# Patient Record
Sex: MALE | Race: BLACK OR AFRICAN AMERICAN | Employment: PART TIME | URBAN - METROPOLITAN AREA
[De-identification: names, ages, dates, MRNs, and addresses within clinical notes are randomized per-mention and may not be internally consistent; named-entity substitution may affect disease eponyms.]

---

## 2020-09-30 ENCOUNTER — HOSPITAL ENCOUNTER (EMERGENCY)
Age: 25
Discharge: HOME | End: 2020-10-01
Attending: EMERGENCY MEDICINE
Payer: COMMERCIAL

## 2020-09-30 ENCOUNTER — APPOINTMENT (OUTPATIENT)
Dept: GENERAL RADIOLOGY | Age: 25
End: 2020-09-30
Attending: EMERGENCY MEDICINE
Payer: COMMERCIAL

## 2020-09-30 ENCOUNTER — HOSPITAL ENCOUNTER (EMERGENCY)
Age: 25
Discharge: STILL ADMITTED OR ADMINISTRATIVE | End: 2020-09-30

## 2020-09-30 DIAGNOSIS — R29.0 CARPOPEDAL SPASM: ICD-10-CM

## 2020-09-30 DIAGNOSIS — Z20.822 EXPOSURE TO COVID-19 VIRUS: ICD-10-CM

## 2020-09-30 DIAGNOSIS — R07.89 LEFT-SIDED CHEST WALL PAIN: Primary | ICD-10-CM

## 2020-09-30 LAB
ALBUMIN SERPL-MCNC: 4.3 G/DL (ref 3.5–5)
ALBUMIN/GLOB SERPL: 1.1 {RATIO} (ref 1.2–3.5)
ALP SERPL-CCNC: 45 U/L (ref 50–136)
ALT SERPL-CCNC: 23 U/L (ref 12–65)
ANION GAP SERPL CALC-SCNC: 7 MMOL/L (ref 7–16)
AST SERPL-CCNC: 24 U/L (ref 15–37)
BASOPHILS # BLD: 0 K/UL (ref 0–0.2)
BASOPHILS NFR BLD: 1 % (ref 0–2)
BILIRUB SERPL-MCNC: 2 MG/DL (ref 0.2–1.1)
BUN SERPL-MCNC: 9 MG/DL (ref 6–23)
CALCIUM SERPL-MCNC: 9.4 MG/DL (ref 8.3–10.4)
CHLORIDE SERPL-SCNC: 106 MMOL/L (ref 98–107)
CO2 SERPL-SCNC: 27 MMOL/L (ref 21–32)
CREAT SERPL-MCNC: 1.06 MG/DL (ref 0.8–1.5)
DIFFERENTIAL METHOD BLD: ABNORMAL
EOSINOPHIL # BLD: 0 K/UL (ref 0–0.8)
EOSINOPHIL NFR BLD: 1 % (ref 0.5–7.8)
ERYTHROCYTE [DISTWIDTH] IN BLOOD BY AUTOMATED COUNT: 11.9 % (ref 11.9–14.6)
GLOBULIN SER CALC-MCNC: 3.9 G/DL (ref 2.3–3.5)
GLUCOSE SERPL-MCNC: 113 MG/DL (ref 65–100)
HCT VFR BLD AUTO: 41.8 % (ref 41.1–50.3)
HGB BLD-MCNC: 14.4 G/DL (ref 13.6–17.2)
IMM GRANULOCYTES # BLD AUTO: 0 K/UL (ref 0–0.5)
IMM GRANULOCYTES NFR BLD AUTO: 0 % (ref 0–5)
LYMPHOCYTES # BLD: 2.3 K/UL (ref 0.5–4.6)
LYMPHOCYTES NFR BLD: 34 % (ref 13–44)
MCH RBC QN AUTO: 30.3 PG (ref 26.1–32.9)
MCHC RBC AUTO-ENTMCNC: 34.4 G/DL (ref 31.4–35)
MCV RBC AUTO: 87.8 FL (ref 79.6–97.8)
MONOCYTES # BLD: 0.6 K/UL (ref 0.1–1.3)
MONOCYTES NFR BLD: 10 % (ref 4–12)
NEUTS SEG # BLD: 3.7 K/UL (ref 1.7–8.2)
NEUTS SEG NFR BLD: 55 % (ref 43–78)
NRBC # BLD: 0 K/UL (ref 0–0.2)
PLATELET # BLD AUTO: 274 K/UL (ref 150–450)
PMV BLD AUTO: 9.2 FL (ref 9.4–12.3)
POTASSIUM SERPL-SCNC: 3.1 MMOL/L (ref 3.5–5.1)
PROT SERPL-MCNC: 8.2 G/DL (ref 6.3–8.2)
RBC # BLD AUTO: 4.76 M/UL (ref 4.23–5.6)
SODIUM SERPL-SCNC: 140 MMOL/L (ref 136–145)
TROPONIN-HIGH SENSITIVITY: 4.7 PG/ML (ref 0–14)
WBC # BLD AUTO: 6.6 K/UL (ref 4.3–11.1)

## 2020-09-30 PROCEDURE — 99284 EMERGENCY DEPT VISIT MOD MDM: CPT

## 2020-09-30 PROCEDURE — 93005 ELECTROCARDIOGRAM TRACING: CPT | Performed by: EMERGENCY MEDICINE

## 2020-09-30 PROCEDURE — 71046 X-RAY EXAM CHEST 2 VIEWS: CPT

## 2020-09-30 PROCEDURE — 74011250636 HC RX REV CODE- 250/636: Performed by: EMERGENCY MEDICINE

## 2020-09-30 PROCEDURE — 87635 SARS-COV-2 COVID-19 AMP PRB: CPT

## 2020-09-30 PROCEDURE — 84484 ASSAY OF TROPONIN QUANT: CPT

## 2020-09-30 PROCEDURE — 96374 THER/PROPH/DIAG INJ IV PUSH: CPT

## 2020-09-30 PROCEDURE — 80053 COMPREHEN METABOLIC PANEL: CPT

## 2020-09-30 PROCEDURE — 85025 COMPLETE CBC W/AUTO DIFF WBC: CPT

## 2020-09-30 PROCEDURE — 74011000250 HC RX REV CODE- 250: Performed by: EMERGENCY MEDICINE

## 2020-09-30 RX ORDER — LIDOCAINE 50 MG/G
PATCH TOPICAL
Qty: 5 EACH | Refills: 0 | Status: SHIPPED | OUTPATIENT
Start: 2020-09-30

## 2020-09-30 RX ORDER — LIDOCAINE 4 G/100G
1 PATCH TOPICAL EVERY 24 HOURS
Status: DISCONTINUED | OUTPATIENT
Start: 2020-09-30 | End: 2020-10-01 | Stop reason: HOSPADM

## 2020-09-30 RX ORDER — NAPROXEN 500 MG/1
500 TABLET ORAL 2 TIMES DAILY WITH MEALS
Qty: 20 TAB | Refills: 0 | Status: SHIPPED | OUTPATIENT
Start: 2020-09-30 | End: 2020-10-10

## 2020-09-30 RX ORDER — KETOROLAC TROMETHAMINE 30 MG/ML
15 INJECTION, SOLUTION INTRAMUSCULAR; INTRAVENOUS
Status: COMPLETED | OUTPATIENT
Start: 2020-09-30 | End: 2020-09-30

## 2020-09-30 RX ADMIN — KETOROLAC TROMETHAMINE 15 MG: 30 INJECTION, SOLUTION INTRAMUSCULAR at 22:53

## 2020-09-30 NOTE — Clinical Note
[facility] EMERGENCY DEPT 
ONE  [address] [facility] 
[phone] Work/School Note Date: 9/30/2020 To Whom It May concern: 
 
Cheri Zuleta was seen and treated today in the emergency room by the following provider(s): 
Attending Provider: Beryle Comer, MD.   
 
Cheri Zuleta is excused from work/school on 09/30/20 and 10/01/20. He is medically clear to return to work/school on 10/2/2020.   
 
 
Sincerely, 
 
 
 
 
Gabrielle Pena MD

## 2020-10-01 VITALS
WEIGHT: 180 LBS | DIASTOLIC BLOOD PRESSURE: 78 MMHG | BODY MASS INDEX: 22.38 KG/M2 | HEIGHT: 75 IN | SYSTOLIC BLOOD PRESSURE: 137 MMHG | OXYGEN SATURATION: 100 % | RESPIRATION RATE: 17 BRPM | TEMPERATURE: 99.8 F | HEART RATE: 70 BPM

## 2020-10-01 LAB
ATRIAL RATE: 88 BPM
CALCULATED P AXIS, ECG09: 67 DEGREES
CALCULATED R AXIS, ECG10: 69 DEGREES
CALCULATED T AXIS, ECG11: 43 DEGREES
COVID-19 RAPID TEST, COVR: NOT DETECTED
DIAGNOSIS, 93000: NORMAL
P-R INTERVAL, ECG05: 154 MS
Q-T INTERVAL, ECG07: 346 MS
QRS DURATION, ECG06: 86 MS
QTC CALCULATION (BEZET), ECG08: 418 MS
SARS COV-2, XPGCVT: NEGATIVE
SOURCE, COVRS: NORMAL
VENTRICULAR RATE, ECG03: 88 BPM

## 2020-10-01 NOTE — ED PROVIDER NOTES
[facility] Emergency Department  Arrival Date/Time: 9/30/2020 @  9:20 PM      Hung Noriega  MRN: [id number]      23 y.o. male    YOB: 1995   No relevant phone numbers on file. 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 Was pushing him around. Started having some left-sided chest wall pain. He continue to work through tolerated it for a while but it got progressively worse. He called his girlfriend subsequently called his mom. She called EMS. At that point he was having severe left-sided point tenderness. EMS was called and he was transported to an outside facility. He was triaged to the waiting room and when they called his name again he was unable to respond because he felt so weak he subsequently stood up fell to the ground. Subsequently they left the facility and his mom brought him here for evaluation    He complains of some numbness and tingling spasms of the hands bilaterally when the pain was severe. He also reports spending the night in skilled nursing Sunday and Monday and is concerned that he may have been exposed to Covid-19    No fever no chills no cough  He did have some shortness of breath earlier. Particularly when he takes a deep breath and it makes his chest hurt more. HPI    Review of Systems: Review of Systems   Constitutional: Negative for activity change, chills and fever. Respiratory: Positive for shortness of breath. Cardiovascular: Positive for chest pain. Gastrointestinal: Negative. Neurological: Negative. Psychiatric/Behavioral: Negative.         Past Medical History: Primary Care Doctor: None  Meds, PMH, PSHx, SocHx at end of this note     Allergies: No Known Allergies      Key Anti-Platelet Anticoagulant Meds     The patient is on no antiplatelet meds or anticoagulants. Physical Exam:  Nursing documentation reviewed. Patient Vitals for the past 24 hrs:   Temp Pulse Resp BP SpO2   09/30/20 2345  70 17 137/78 100 %   09/30/20 2314   16 139/85 100 %   09/30/20 2215    137/78 97 %   09/30/20 2125 99.8 °F (37.7 °C) 85 16 137/73 97 %    Vital signs were reviewed. Physical Exam  Vitals signs and nursing note reviewed. Constitutional:       General: He is not in acute distress. Appearance: He is well-developed. He is not ill-appearing. HENT:      Head: Normocephalic. Eyes:      Pupils: Pupils are equal, round, and reactive to light. Neck:      Musculoskeletal: Normal range of motion and neck supple. Cardiovascular:      Rate and Rhythm: Normal rate and regular rhythm. Pulmonary:      Effort: No tachypnea or respiratory distress. Breath sounds: No decreased breath sounds, wheezing or rhonchi. Chest:      Chest wall: Tenderness present. Abdominal:      Palpations: There is no hepatomegaly, splenomegaly or mass. Tenderness: There is no abdominal tenderness. Musculoskeletal: Normal range of motion. Right lower leg: He exhibits no tenderness. No edema. Left lower leg: He exhibits no tenderness. No edema. Skin:     General: Skin is warm. Capillary Refill: Capillary refill takes less than 2 seconds. Neurological:      Mental Status: He is alert and oriented to person, place, and time. Psychiatric:         Mood and Affect: Mood is not anxious. Behavior: Behavior is not agitated. MEDICAL DECISION MAKING: (Including Differential Dx, and Plan)   MDM  Number of Diagnoses or Management Options  Carpopedal spasm:   Exposure to COVID-19 virus:   Left-sided chest wall pain:   Diagnosis management comments: Well-appearing 74-year-old male presents to the emergency department with left upper chest wall pain. Worse with deep breath. Worse with palpation    Onset after moving a washer and dryer earlier    Get severe started hyperventilating had some numbness and tingling in his hands    Differential includes pneumothorax pneumonia pleurisy    Will obtain chest x-ray EKG    Due to his time at the [facility] center we will add a coronavirus test as well.        Amount and/or Complexity of Data Reviewed  Clinical lab tests: ordered and reviewed  Tests in the radiology section of CPT®: ordered and reviewed  Tests in the medicine section of CPT®: ordered and reviewed  Decide to obtain previous medical records or to obtain history from someone other than the patient: yes  Obtain history from someone other than the patient: yes (Mother)  Independent visualization of images, tracings, or specimens: yes    Risk of Complications, Morbidity, and/or Mortality  Presenting problems: high  Diagnostic procedures: minimal  Management options: moderate         Data/Management:  (khanh)   Lab findings during this visit:   Recent Results (from the past 48 hour(s))   EKG, 12 LEAD, INITIAL    Collection Time: 09/30/20  9:16 PM   Result Value Ref Range    Ventricular Rate 88 BPM    Atrial Rate 88 BPM    P-R Interval 154 ms    QRS Duration 86 ms    Q-T Interval 346 ms    QTC Calculation (Bezet) 418 ms    Calculated P Axis 67 degrees    Calculated R Axis 69 degrees    Calculated T Axis 43 degrees    Diagnosis       Normal sinus rhythm  Normal ECG  No previous ECGs available     CBC WITH AUTOMATED DIFF    Collection Time: 09/30/20  9:28 PM   Result Value Ref Range    WBC 6.6 4.3 - 11.1 K/uL    RBC 4.76 4.23 - 5.6 M/uL    HGB 14.4 13.6 - 17.2 g/dL    HCT 41.8 41.1 - 50.3 %    MCV 87.8 79.6 - 97.8 FL    MCH 30.3 26.1 - 32.9 PG    MCHC 34.4 31.4 - 35.0 g/dL    RDW 11.9 11.9 - 14.6 %    PLATELET 516 096 - 124 K/uL    MPV 9.2 (L) 9.4 - 12.3 FL    ABSOLUTE NRBC 0.00 0.0 - 0.2 K/uL    DF AUTOMATED      NEUTROPHILS 55 43 - 78 %    LYMPHOCYTES 34 13 - 44 %    MONOCYTES 10 4.0 - 12.0 %    EOSINOPHILS 1 0.5 - 7.8 %    BASOPHILS 1 0.0 - 2.0 %    IMMATURE GRANULOCYTES 0 0.0 - 5.0 %    ABS. NEUTROPHILS 3.7 1.7 - 8.2 K/UL    ABS. LYMPHOCYTES 2.3 0.5 - 4.6 K/UL    ABS. MONOCYTES 0.6 0.1 - 1.3 K/UL    ABS. EOSINOPHILS 0.0 0.0 - 0.8 K/UL    ABS. BASOPHILS 0.0 0.0 - 0.2 K/UL    ABS. IMM. GRANS. 0.0 0.0 - 0.5 K/UL   METABOLIC PANEL, COMPREHENSIVE    Collection Time: 09/30/20  9:28 PM   Result Value Ref Range    Sodium 140 136 - 145 mmol/L    Potassium 3.1 (L) 3.5 - 5.1 mmol/L    Chloride 106 98 - 107 mmol/L    CO2 27 21 - 32 mmol/L    Anion gap 7 7 - 16 mmol/L    Glucose 113 (H) 65 - 100 mg/dL    BUN 9 6 - 23 MG/DL    Creatinine 1.06 0.8 - 1.5 MG/DL    GFR est AA >60 >60 ml/min/1.73m2    GFR est non-AA >60 >60 ml/min/1.73m2    Calcium 9.4 8.3 - 10.4 MG/DL    Bilirubin, total 2.0 (H) 0.2 - 1.1 MG/DL    ALT (SGPT) 23 12 - 65 U/L    AST (SGOT) 24 15 - 37 U/L    Alk.  phosphatase 45 (L) 50 - 136 U/L    Protein, total 8.2 6.3 - 8.2 g/dL    Albumin 4.3 3.5 - 5.0 g/dL    Globulin 3.9 (H) 2.3 - 3.5 g/dL    A-G Ratio 1.1 (L) 1.2 - 3.5     TROPONIN-HIGH SENSITIVITY    Collection Time: 09/30/20  9:28 PM   Result Value Ref Range    Troponin-High Sensitivity 4.7 0 - 14 pg/mL   SARS-COV-2    Collection Time: 09/30/20 10:57 PM   Result Value Ref Range    SARS-CoV-2 PENDING     Specimen source Nasopharyngeal      COVID-19 rapid test Not detected NOTD      SARS CoV-2 PENDING      Radiology studies during this visit: Xr Chest Pa Lat    Result Date: 9/30/2020  IMPRESSION: Normal chest.    Medications given in the ED:   Medications   lidocaine 4 % patch 1 Patch (1 Patch TransDERmal Apply Patch 9/30/20 2253)   ketorolac (TORADOL) injection 15 mg (15 mg IntraVENous Given 9/30/20 2253)        ECG- reviewed in the absence of a cardiology by the ED Physician  Previous ECG: not available for examination  Interpretation: normal Quality: fair   Rate: 88 Rhythm: normal sinus rhythm   Ectopy: none QRS: normal    Conduction: normal ST segments: normal   Interpretation: NSR  Bijal Zimmer MD 10:18 PM         Procedure Documentation:    (.addlac  .addabscess   . addreduction   . addintubation    . addprocdoc)      Procedures    Recheck and Additional Documentation:  (use .addrecheck  . addsepsis   . addstroke   . addhip  . addhandoff  . addcctime . emergcnt )     Well-appearing 26-year-old male    EKG is normal without ST or T wave changes. Troponin is less than 5. Chest x-ray does not show any pneumothorax. The heart and aorta appear normal.  There is no pneumonias    Given the point tenderness of the chest wall suspect he has some costochondritis or pleurisy    He is not tachycardic tachypneic hypoxic hypotensive there is no leg pain or leg swelling no hemoptysis no history of PE or DVT no exogenous estrogen. I doubt that he has a pulmonary emboli and I do not think any further testing is necessary    There is no radiation of pain into the back. No ripping or tearing sensation. He is not hypertensive and aorta looks normal on chest x-ray do not think any evaluation for aortic dissection is indicated    He is given Toradol. Swab for corona. Reassess. Other ED Course Notes:        I wore appropriate PPE throughout this patient's ED encounter. Assessment and Plan:      Disposition:    home   Condition @ Disposition   stable   Time of Disposition   2334         Impression:     ICD-10-CM ICD-9-CM   1. Left-sided chest wall pain  R07.89 786.52   2. Carpopedal spasm  R29.0 781.7   3.  Exposure to COVID-19 virus  Z20.828 V01.79        Follow-up:   Follow-up Information     Follow up With Specialties Details Why [address] [facility] EMERGENCY DEPT Emergency Medicine  Come back to the ED if you get worse or develop any new problems [phone] [id number]  [phone]         Discharge Medications:   Discharge Medication List as of 9/30/2020 11:34 PM      START taking these medications    Details   naproxen (Naprosyn) 500 mg tablet Take 1 Tab by mouth two (2) times daily (with meals) for 10 days. , Print, Disp-20 Tab,R-0      lidocaine (Lidoderm) 5 % Apply patch to the affected area for 12 hours a day and remove for 12 hours a day., Print, Disp-5 Each,R-0              Past Medical History:    No past medical history on file. No past surgical history on file.   Social History     Tobacco Use    Smoking status: Not on file   Substance Use Topics    Alcohol use: Not on file    Drug use: Not on file     None

## 2020-10-01 NOTE — ED NOTES
I have reviewed discharge instructions with the patient. The patient verbalized understanding. Patient left ED via Discharge Method: ambulatory to Home with self. Opportunity for questions and clarification provided. Patient given 2 scripts. To continue your aftercare when you leave the hospital, you may receive an automated call from our care team to check in on how you are doing. This is a free service and part of our promise to provide the best care and service to meet your aftercare needs.  If you have questions, or wish to unsubscribe from this service please call [phone]. Thank you for Choosing our [employer] Emergency Department.

## 2020-10-01 NOTE — ED TRIAGE NOTES
Arrives POV with mother. Mother frantically requesting assistance to get pt from vehicle in EMS bay. Assisted pt out of vehicle via wheelchair. Pt crying and hyperventilating on arrival to triage. Reports left sided non-radiating chest pain, shortness of breath, chills, diarrhea. Onset this AM. Reports pain as constant and \"continuous ache\". Increased pain with inspiration. Denies cough, n/v, fevers. Reports tingling to hands. Reports possibly exposed to COVID 2 nights ago in [facility]. Denies injury/trauma to chest. Reports right wrist pain from injury incurred while incarcerated.  Attempted to be seen at [address] [place] [address]

## 2020-10-01 NOTE — DISCHARGE INSTRUCTIONS
Cold compress to chest  Avoid lifting, pulling    Take medications as directed     Recent Results (from the past 8 hour(s))   EKG, 12 LEAD, INITIAL    Collection Time: 09/30/20  9:16 PM   Result Value Ref Range    Ventricular Rate 88 BPM    Atrial Rate 88 BPM    P-R Interval 154 ms    QRS Duration 86 ms    Q-T Interval 346 ms    QTC Calculation (Bezet) 418 ms    Calculated P Axis 67 degrees    Calculated R Axis 69 degrees    Calculated T Axis 43 degrees    Diagnosis       Normal sinus rhythm  Normal ECG  No previous ECGs available     CBC WITH AUTOMATED DIFF    Collection Time: 09/30/20  9:28 PM   Result Value Ref Range    WBC 6.6 4.3 - 11.1 K/uL    RBC 4.76 4.23 - 5.6 M/uL    HGB 14.4 13.6 - 17.2 g/dL    HCT 41.8 41.1 - 50.3 %    MCV 87.8 79.6 - 97.8 FL    MCH 30.3 26.1 - 32.9 PG    MCHC 34.4 31.4 - 35.0 g/dL    RDW 11.9 11.9 - 14.6 %    PLATELET 112 978 - 723 K/uL    MPV 9.2 (L) 9.4 - 12.3 FL    ABSOLUTE NRBC 0.00 0.0 - 0.2 K/uL    DF AUTOMATED      NEUTROPHILS 55 43 - 78 %    LYMPHOCYTES 34 13 - 44 %    MONOCYTES 10 4.0 - 12.0 %    EOSINOPHILS 1 0.5 - 7.8 %    BASOPHILS 1 0.0 - 2.0 %    IMMATURE GRANULOCYTES 0 0.0 - 5.0 %    ABS. NEUTROPHILS 3.7 1.7 - 8.2 K/UL    ABS. LYMPHOCYTES 2.3 0.5 - 4.6 K/UL    ABS. MONOCYTES 0.6 0.1 - 1.3 K/UL    ABS. EOSINOPHILS 0.0 0.0 - 0.8 K/UL    ABS. BASOPHILS 0.0 0.0 - 0.2 K/UL    ABS. IMM. GRANS. 0.0 0.0 - 0.5 K/UL   METABOLIC PANEL, COMPREHENSIVE    Collection Time: 09/30/20  9:28 PM   Result Value Ref Range    Sodium 140 136 - 145 mmol/L    Potassium 3.1 (L) 3.5 - 5.1 mmol/L    Chloride 106 98 - 107 mmol/L    CO2 27 21 - 32 mmol/L    Anion gap 7 7 - 16 mmol/L    Glucose 113 (H) 65 - 100 mg/dL    BUN 9 6 - 23 MG/DL    Creatinine 1.06 0.8 - 1.5 MG/DL    GFR est AA >60 >60 ml/min/1.73m2    GFR est non-AA >60 >60 ml/min/1.73m2    Calcium 9.4 8.3 - 10.4 MG/DL    Bilirubin, total 2.0 (H) 0.2 - 1.1 MG/DL    ALT (SGPT) 23 12 - 65 U/L    AST (SGOT) 24 15 - 37 U/L    Alk.  phosphatase 45 (L) 50 - 136 U/L    Protein, total 8.2 6.3 - 8.2 g/dL    Albumin 4.3 3.5 - 5.0 g/dL    Globulin 3.9 (H) 2.3 - 3.5 g/dL    A-G Ratio 1.1 (L) 1.2 - 3.5     TROPONIN-HIGH SENSITIVITY    Collection Time: 09/30/20  9:28 PM   Result Value Ref Range    Troponin-High Sensitivity 4.7 0 - 14 pg/mL     Xr Chest Pa Lat    Result Date: 9/30/2020  EXAM: XR CHEST PA LAT INDICATION: chest pain-shortness of breath COMPARISON: None. FINDINGS: PA and lateral radiographs of the chest demonstrate clear lungs. The cardiac and mediastinal contours and pulmonary vascularity are normal. The bones and soft tissues are within normal limits.      IMPRESSION: Normal chest.

## 2020-10-02 ENCOUNTER — PATIENT OUTREACH (OUTPATIENT)
Dept: CASE MANAGEMENT | Age: 25
End: 2020-10-02

## 2020-10-05 ENCOUNTER — PATIENT OUTREACH (OUTPATIENT)
Dept: CASE MANAGEMENT | Age: 25
End: 2020-10-05

## 2020-10-06 NOTE — PROGRESS NOTES
COVID CARE TRANSITIONS    Second unsuccessful attempt to reach patient/ family by telephone. Left HIPPA compliant message requesting a return call. No response by end of day. Will not attempt to reach patient again and episode will be resolved. Charleen Nguyen